# Patient Record
Sex: MALE | Race: WHITE | NOT HISPANIC OR LATINO | Employment: STUDENT | URBAN - NONMETROPOLITAN AREA
[De-identification: names, ages, dates, MRNs, and addresses within clinical notes are randomized per-mention and may not be internally consistent; named-entity substitution may affect disease eponyms.]

---
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
## 2019-07-15 ENCOUNTER — TRANSCRIBE ORDERS (OUTPATIENT)
Dept: LAB | Facility: HOSPITAL | Age: 19
End: 2019-07-15

## 2019-07-15 ENCOUNTER — APPOINTMENT (OUTPATIENT)
Dept: LAB | Facility: HOSPITAL | Age: 19
End: 2019-07-15

## 2019-07-15 DIAGNOSIS — R53.83 MALAISE AND FATIGUE: Primary | ICD-10-CM

## 2019-07-15 DIAGNOSIS — R53.81 MALAISE AND FATIGUE: Primary | ICD-10-CM

## 2019-07-15 LAB
ALBUMIN SERPL-MCNC: 4.5 G/DL (ref 3.5–5)
ALBUMIN/GLOB SERPL: 1.1 G/DL (ref 1.1–2.5)
ALP SERPL-CCNC: 248 U/L (ref 65–260)
ALT SERPL W P-5'-P-CCNC: 566 U/L (ref 0–54)
ANION GAP SERPL CALCULATED.3IONS-SCNC: 9 MMOL/L (ref 4–13)
AST SERPL-CCNC: 270 U/L (ref 7–45)
BILIRUB SERPL-MCNC: 0.4 MG/DL (ref 0.6–1.4)
BUN BLD-MCNC: 17 MG/DL (ref 5–21)
BUN/CREAT SERPL: 17.7 (ref 7–25)
CALCIUM SPEC-SCNC: 10 MG/DL (ref 8.4–10.4)
CHLORIDE SERPL-SCNC: 99 MMOL/L (ref 98–110)
CO2 SERPL-SCNC: 31 MMOL/L (ref 24–31)
CREAT BLD-MCNC: 0.96 MG/DL (ref 0.5–1.4)
DEPRECATED RDW RBC AUTO: 41.5 FL (ref 40–54)
ERYTHROCYTE [DISTWIDTH] IN BLOOD BY AUTOMATED COUNT: 13.6 % (ref 12–15)
GFR SERPL CREATININE-BSD FRML MDRD: 101 ML/MIN/1.73
GIANT PLATELETS: ABNORMAL
GLOBULIN UR ELPH-MCNC: 4.1 GM/DL
GLUCOSE BLD-MCNC: 94 MG/DL (ref 70–100)
HCT VFR BLD AUTO: 48.6 % (ref 40–52)
HGB BLD-MCNC: 15.9 G/DL (ref 14–18)
LYMPHOCYTES # BLD MANUAL: 1.84 10*3/MM3 (ref 0.72–4.86)
LYMPHOCYTES NFR BLD MANUAL: 21.9 % (ref 15–45)
LYMPHOCYTES NFR BLD MANUAL: 9.4 % (ref 4–12)
MCH RBC QN AUTO: 27.3 PG (ref 28–32)
MCHC RBC AUTO-ENTMCNC: 32.7 G/DL (ref 33–36)
MCV RBC AUTO: 83.5 FL (ref 82–95)
MONOCYTES # BLD AUTO: 0.79 10*3/MM3 (ref 0.19–1.3)
NEUTROPHILS # BLD AUTO: 2.54 10*3/MM3 (ref 1.87–8.4)
NEUTROPHILS NFR BLD MANUAL: 28.1 % (ref 39–78)
NEUTS BAND NFR BLD MANUAL: 2.1 % (ref 0–10)
PLATELET # BLD AUTO: 172 10*3/MM3 (ref 130–400)
PMV BLD AUTO: 11.2 FL (ref 6–12)
POTASSIUM BLD-SCNC: 4.7 MMOL/L (ref 3.5–5.3)
PROT SERPL-MCNC: 8.6 G/DL (ref 6.3–8.7)
RBC # BLD AUTO: 5.82 10*6/MM3 (ref 4.8–5.9)
RBC MORPH BLD: NORMAL
SODIUM BLD-SCNC: 139 MMOL/L (ref 135–145)
VARIANT LYMPHS NFR BLD MANUAL: 38.5 % (ref 0–5)
WBC MORPH BLD: NORMAL
WBC NRBC COR # BLD: 8.4 10*3/MM3 (ref 4.8–10.8)

## 2019-07-15 PROCEDURE — 86665 EPSTEIN-BARR CAPSID VCA: CPT | Performed by: PHYSICIAN ASSISTANT

## 2019-07-15 PROCEDURE — 86664 EPSTEIN-BARR NUCLEAR ANTIGEN: CPT | Performed by: PHYSICIAN ASSISTANT

## 2019-07-15 PROCEDURE — 86663 EPSTEIN-BARR ANTIBODY: CPT | Performed by: PHYSICIAN ASSISTANT

## 2019-07-15 PROCEDURE — 85007 BL SMEAR W/DIFF WBC COUNT: CPT | Performed by: PHYSICIAN ASSISTANT

## 2019-07-15 PROCEDURE — 80053 COMPREHEN METABOLIC PANEL: CPT | Performed by: PHYSICIAN ASSISTANT

## 2019-07-15 PROCEDURE — 85025 COMPLETE CBC W/AUTO DIFF WBC: CPT | Performed by: PHYSICIAN ASSISTANT

## 2019-07-15 PROCEDURE — 36415 COLL VENOUS BLD VENIPUNCTURE: CPT | Performed by: PHYSICIAN ASSISTANT

## 2019-07-17 ENCOUNTER — TRANSCRIBE ORDERS (OUTPATIENT)
Dept: ADMINISTRATIVE | Facility: HOSPITAL | Age: 19
End: 2019-07-17

## 2019-07-17 DIAGNOSIS — R53.83 OTHER FATIGUE: Primary | ICD-10-CM

## 2019-07-17 LAB
EBV EA IGG SER-ACNC: <9 U/ML (ref 0–8.9)
EBV NA IGG SER IA-ACNC: <18 U/ML (ref 0–17.9)
EBV VCA IGG SER-ACNC: <18 U/ML (ref 0–17.9)
EBV VCA IGM SER-ACNC: >160 U/ML (ref 0–35.9)
INTERPRETATION: ABNORMAL

## 2019-07-30 ENCOUNTER — LAB (OUTPATIENT)
Dept: LAB | Facility: HOSPITAL | Age: 19
End: 2019-07-30

## 2019-07-30 DIAGNOSIS — R53.83 OTHER FATIGUE: ICD-10-CM

## 2019-07-30 LAB
ALBUMIN SERPL-MCNC: 4.4 G/DL (ref 3.5–5)
ALBUMIN/GLOB SERPL: 1.2 G/DL (ref 1.1–2.5)
ALP SERPL-CCNC: 114 U/L (ref 65–260)
ALT SERPL W P-5'-P-CCNC: 90 U/L (ref 0–54)
ANION GAP SERPL CALCULATED.3IONS-SCNC: 9 MMOL/L (ref 4–13)
AST SERPL-CCNC: 38 U/L (ref 7–45)
BASOPHILS # BLD AUTO: 0.05 10*3/MM3 (ref 0–0.2)
BASOPHILS NFR BLD AUTO: 0.6 % (ref 0–2)
BILIRUB SERPL-MCNC: 0.5 MG/DL (ref 0.6–1.4)
BUN BLD-MCNC: 20 MG/DL (ref 5–21)
BUN/CREAT SERPL: 18.7 (ref 7–25)
CALCIUM SPEC-SCNC: 9.6 MG/DL (ref 8.4–10.4)
CHLORIDE SERPL-SCNC: 99 MMOL/L (ref 98–110)
CO2 SERPL-SCNC: 30 MMOL/L (ref 24–31)
CREAT BLD-MCNC: 1.07 MG/DL (ref 0.5–1.4)
DEPRECATED RDW RBC AUTO: 38.7 FL (ref 40–54)
EOSINOPHIL # BLD AUTO: 0.15 10*3/MM3 (ref 0–0.7)
EOSINOPHIL NFR BLD AUTO: 1.9 % (ref 0–4)
ERYTHROCYTE [DISTWIDTH] IN BLOOD BY AUTOMATED COUNT: 13 % (ref 12–15)
GFR SERPL CREATININE-BSD FRML MDRD: 89 ML/MIN/1.73
GLOBULIN UR ELPH-MCNC: 3.6 GM/DL
GLUCOSE BLD-MCNC: 82 MG/DL (ref 70–100)
HCT VFR BLD AUTO: 42.6 % (ref 40–52)
HGB BLD-MCNC: 14.1 G/DL (ref 14–18)
IMM GRANULOCYTES # BLD AUTO: 0.02 10*3/MM3 (ref 0–0.05)
IMM GRANULOCYTES NFR BLD AUTO: 0.3 % (ref 0–5)
LYMPHOCYTES # BLD AUTO: 2.01 10*3/MM3 (ref 0.72–4.86)
LYMPHOCYTES NFR BLD AUTO: 25.6 % (ref 15–45)
MCH RBC QN AUTO: 27 PG (ref 28–32)
MCHC RBC AUTO-ENTMCNC: 33.1 G/DL (ref 33–36)
MCV RBC AUTO: 81.6 FL (ref 82–95)
MONOCYTES # BLD AUTO: 0.89 10*3/MM3 (ref 0.19–1.3)
MONOCYTES NFR BLD AUTO: 11.3 % (ref 4–12)
NEUTROPHILS # BLD AUTO: 4.74 10*3/MM3 (ref 1.87–8.4)
NEUTROPHILS NFR BLD AUTO: 60.3 % (ref 39–78)
NRBC BLD AUTO-RTO: 0 /100 WBC (ref 0–0.2)
PLATELET # BLD AUTO: 177 10*3/MM3 (ref 130–400)
PMV BLD AUTO: 10.8 FL (ref 6–12)
POTASSIUM BLD-SCNC: 4.2 MMOL/L (ref 3.5–5.3)
PROT SERPL-MCNC: 8 G/DL (ref 6.3–8.7)
RBC # BLD AUTO: 5.22 10*6/MM3 (ref 4.8–5.9)
SODIUM BLD-SCNC: 138 MMOL/L (ref 135–145)
WBC NRBC COR # BLD: 7.86 10*3/MM3 (ref 4.8–10.8)

## 2019-07-30 PROCEDURE — 80053 COMPREHEN METABOLIC PANEL: CPT | Performed by: INTERNAL MEDICINE

## 2019-07-30 PROCEDURE — 85025 COMPLETE CBC W/AUTO DIFF WBC: CPT | Performed by: INTERNAL MEDICINE

## 2019-07-30 PROCEDURE — 36415 COLL VENOUS BLD VENIPUNCTURE: CPT

## 2019-07-31 ENCOUNTER — TRANSCRIBE ORDERS (OUTPATIENT)
Dept: ADMINISTRATIVE | Facility: HOSPITAL | Age: 19
End: 2019-07-31

## 2019-07-31 DIAGNOSIS — R53.83 OTHER FATIGUE: Primary | ICD-10-CM

## 2019-09-25 ENCOUNTER — TRANSCRIBE ORDERS (OUTPATIENT)
Dept: ADMINISTRATIVE | Facility: HOSPITAL | Age: 19
End: 2019-09-25

## 2019-09-25 ENCOUNTER — HOSPITAL ENCOUNTER (OUTPATIENT)
Dept: CT IMAGING | Facility: HOSPITAL | Age: 19
Discharge: HOME | End: 2019-09-25
Admitting: INTERNAL MEDICINE

## 2019-09-25 ENCOUNTER — LAB (OUTPATIENT)
Dept: LAB | Facility: HOSPITAL | Age: 19
End: 2019-09-25

## 2019-09-25 DIAGNOSIS — R10.9 ABDOMINAL PAIN, UNSPECIFIED ABDOMINAL LOCATION: Primary | ICD-10-CM

## 2019-09-25 DIAGNOSIS — R10.31 RIGHT LOWER QUADRANT ABDOMINAL PAIN: Primary | ICD-10-CM

## 2019-09-25 DIAGNOSIS — R10.9 ABDOMINAL PAIN, UNSPECIFIED ABDOMINAL LOCATION: ICD-10-CM

## 2019-09-25 DIAGNOSIS — R10.31 RIGHT LOWER QUADRANT ABDOMINAL PAIN: ICD-10-CM

## 2019-09-25 LAB
ALBUMIN SERPL-MCNC: 4.6 G/DL (ref 3.5–5.2)
ALBUMIN/GLOB SERPL: 1.4 G/DL
ALP SERPL-CCNC: 87 U/L (ref 39–117)
ALT SERPL W P-5'-P-CCNC: 27 U/L (ref 1–41)
ANION GAP SERPL CALCULATED.3IONS-SCNC: 7 MMOL/L (ref 5–15)
AST SERPL-CCNC: 22 U/L (ref 1–40)
BASOPHILS # BLD AUTO: 0.04 10*3/MM3 (ref 0–0.2)
BASOPHILS NFR BLD AUTO: 0.6 % (ref 0–1.5)
BILIRUB SERPL-MCNC: 0.5 MG/DL (ref 0.2–1.2)
BUN BLD-MCNC: 15 MG/DL (ref 6–20)
BUN/CREAT SERPL: 19.2 (ref 7–25)
CALCIUM SPEC-SCNC: 9.3 MG/DL (ref 8.6–10.5)
CHLORIDE SERPL-SCNC: 102 MMOL/L (ref 98–107)
CO2 SERPL-SCNC: 30 MMOL/L (ref 22–29)
CREAT BLD-MCNC: 0.78 MG/DL (ref 0.76–1.27)
CREAT BLDA-MCNC: 0.8 MG/DL (ref 0.6–1.3)
DEPRECATED RDW RBC AUTO: 39.7 FL (ref 37–54)
EOSINOPHIL # BLD AUTO: 0.24 10*3/MM3 (ref 0–0.4)
EOSINOPHIL NFR BLD AUTO: 3.6 % (ref 0.3–6.2)
ERYTHROCYTE [DISTWIDTH] IN BLOOD BY AUTOMATED COUNT: 13.2 % (ref 12.3–15.4)
GFR SERPL CREATININE-BSD FRML MDRD: 128 ML/MIN/1.73
GLOBULIN UR ELPH-MCNC: 3.2 GM/DL
GLUCOSE BLD-MCNC: 99 MG/DL (ref 65–99)
HCT VFR BLD AUTO: 44.7 % (ref 37.5–51)
HGB BLD-MCNC: 15.1 G/DL (ref 13–17.7)
IMM GRANULOCYTES # BLD AUTO: 0.01 10*3/MM3 (ref 0–0.05)
IMM GRANULOCYTES NFR BLD AUTO: 0.1 % (ref 0–0.5)
LIPASE SERPL-CCNC: 21 U/L (ref 13–60)
LYMPHOCYTES # BLD AUTO: 1.6 10*3/MM3 (ref 0.7–3.1)
LYMPHOCYTES NFR BLD AUTO: 23.8 % (ref 19.6–45.3)
MCH RBC QN AUTO: 27.8 PG (ref 26.6–33)
MCHC RBC AUTO-ENTMCNC: 33.8 G/DL (ref 31.5–35.7)
MCV RBC AUTO: 82.3 FL (ref 79–97)
MONOCYTES # BLD AUTO: 0.74 10*3/MM3 (ref 0.1–0.9)
MONOCYTES NFR BLD AUTO: 11 % (ref 5–12)
NEUTROPHILS # BLD AUTO: 4.08 10*3/MM3 (ref 1.7–7)
NEUTROPHILS NFR BLD AUTO: 60.9 % (ref 42.7–76)
NRBC BLD AUTO-RTO: 0 /100 WBC (ref 0–0.2)
PLATELET # BLD AUTO: 167 10*3/MM3 (ref 140–450)
PMV BLD AUTO: 10.6 FL (ref 6–12)
POTASSIUM BLD-SCNC: 4.5 MMOL/L (ref 3.5–5.2)
PROT SERPL-MCNC: 7.8 G/DL (ref 6–8.5)
RBC # BLD AUTO: 5.43 10*6/MM3 (ref 4.14–5.8)
SODIUM BLD-SCNC: 139 MMOL/L (ref 136–145)
WBC NRBC COR # BLD: 6.71 10*3/MM3 (ref 3.4–10.8)

## 2019-09-25 PROCEDURE — 83690 ASSAY OF LIPASE: CPT | Performed by: INTERNAL MEDICINE

## 2019-09-25 PROCEDURE — 74177 CT ABD & PELVIS W/CONTRAST: CPT

## 2019-09-25 PROCEDURE — 25010000002 IOPAMIDOL 61 % SOLUTION: Performed by: INTERNAL MEDICINE

## 2019-09-25 PROCEDURE — 85025 COMPLETE CBC W/AUTO DIFF WBC: CPT | Performed by: INTERNAL MEDICINE

## 2019-09-25 PROCEDURE — 80053 COMPREHEN METABOLIC PANEL: CPT | Performed by: INTERNAL MEDICINE

## 2019-09-25 PROCEDURE — 36415 COLL VENOUS BLD VENIPUNCTURE: CPT | Performed by: INTERNAL MEDICINE

## 2019-09-25 PROCEDURE — 82565 ASSAY OF CREATININE: CPT

## 2019-09-25 RX ADMIN — IOPAMIDOL 100 ML: 612 INJECTION, SOLUTION INTRAVENOUS at 11:37

## 2020-07-16 ENCOUNTER — APPOINTMENT (OUTPATIENT)
Dept: GENERAL RADIOLOGY | Facility: HOSPITAL | Age: 20
End: 2020-07-16

## 2020-07-16 ENCOUNTER — HOSPITAL ENCOUNTER (EMERGENCY)
Facility: HOSPITAL | Age: 20
Discharge: HOME | End: 2020-07-16
Admitting: FAMILY MEDICINE

## 2020-07-16 VITALS
WEIGHT: 190 LBS | SYSTOLIC BLOOD PRESSURE: 154 MMHG | BODY MASS INDEX: 23.14 KG/M2 | HEART RATE: 97 BPM | RESPIRATION RATE: 18 BRPM | TEMPERATURE: 98.5 F | OXYGEN SATURATION: 98 % | DIASTOLIC BLOOD PRESSURE: 83 MMHG | HEIGHT: 76 IN

## 2020-07-16 DIAGNOSIS — B34.8 RHINOVIRUS INFECTION: Primary | ICD-10-CM

## 2020-07-16 LAB
ALBUMIN SERPL-MCNC: 5.5 G/DL (ref 3.5–5.2)
ALBUMIN/GLOB SERPL: 1.8 G/DL
ALP SERPL-CCNC: 92 U/L (ref 39–117)
ALT SERPL W P-5'-P-CCNC: 15 U/L (ref 1–41)
ANION GAP SERPL CALCULATED.3IONS-SCNC: 17 MMOL/L (ref 5–15)
AST SERPL-CCNC: 18 U/L (ref 1–40)
B PARAPERT DNA SPEC QL NAA+PROBE: NOT DETECTED
B PERT DNA SPEC QL NAA+PROBE: NOT DETECTED
BASOPHILS # BLD AUTO: 0.04 10*3/MM3 (ref 0–0.2)
BASOPHILS NFR BLD AUTO: 0.3 % (ref 0–1.5)
BILIRUB SERPL-MCNC: 0.4 MG/DL (ref 0–1.2)
BUN SERPL-MCNC: 20 MG/DL (ref 6–20)
BUN/CREAT SERPL: 20.2 (ref 7–25)
C PNEUM DNA NPH QL NAA+NON-PROBE: NOT DETECTED
CALCIUM SPEC-SCNC: 10.3 MG/DL (ref 8.6–10.5)
CHLORIDE SERPL-SCNC: 99 MMOL/L (ref 98–107)
CO2 SERPL-SCNC: 24 MMOL/L (ref 22–29)
CREAT SERPL-MCNC: 0.99 MG/DL (ref 0.76–1.27)
CRP SERPL-MCNC: 0.12 MG/DL (ref 0–0.5)
D DIMER PPP FEU-MCNC: <0.22 MG/L (FEU) (ref 0–0.5)
D-LACTATE SERPL-SCNC: 3.2 MMOL/L (ref 0.5–2)
DEPRECATED RDW RBC AUTO: 35.8 FL (ref 37–54)
EOSINOPHIL # BLD AUTO: 0.03 10*3/MM3 (ref 0–0.4)
EOSINOPHIL NFR BLD AUTO: 0.2 % (ref 0.3–6.2)
ERYTHROCYTE [DISTWIDTH] IN BLOOD BY AUTOMATED COUNT: 11.9 % (ref 12.3–15.4)
FLUAV H1 2009 PAND RNA NPH QL NAA+PROBE: NOT DETECTED
FLUAV H1 HA GENE NPH QL NAA+PROBE: NOT DETECTED
FLUAV H3 RNA NPH QL NAA+PROBE: NOT DETECTED
FLUAV SUBTYP SPEC NAA+PROBE: NOT DETECTED
FLUBV RNA ISLT QL NAA+PROBE: NOT DETECTED
GFR SERPL CREATININE-BSD FRML MDRD: 96 ML/MIN/1.73
GLOBULIN UR ELPH-MCNC: 3.1 GM/DL
GLUCOSE SERPL-MCNC: 97 MG/DL (ref 65–99)
HADV DNA SPEC NAA+PROBE: NOT DETECTED
HCOV 229E RNA SPEC QL NAA+PROBE: NOT DETECTED
HCOV HKU1 RNA SPEC QL NAA+PROBE: NOT DETECTED
HCOV NL63 RNA SPEC QL NAA+PROBE: NOT DETECTED
HCOV OC43 RNA SPEC QL NAA+PROBE: NOT DETECTED
HCT VFR BLD AUTO: 46.5 % (ref 37.5–51)
HGB BLD-MCNC: 15.9 G/DL (ref 13–17.7)
HMPV RNA NPH QL NAA+NON-PROBE: NOT DETECTED
HPIV1 RNA SPEC QL NAA+PROBE: NOT DETECTED
HPIV2 RNA SPEC QL NAA+PROBE: NOT DETECTED
HPIV3 RNA NPH QL NAA+PROBE: NOT DETECTED
HPIV4 P GENE NPH QL NAA+PROBE: NOT DETECTED
IMM GRANULOCYTES # BLD AUTO: 0.04 10*3/MM3 (ref 0–0.05)
IMM GRANULOCYTES NFR BLD AUTO: 0.3 % (ref 0–0.5)
LACTATE HOLD SPECIMEN: NORMAL
LDH SERPL-CCNC: 184 U/L (ref 135–225)
LYMPHOCYTES # BLD AUTO: 1.49 10*3/MM3 (ref 0.7–3.1)
LYMPHOCYTES NFR BLD AUTO: 10.9 % (ref 19.6–45.3)
M PNEUMO IGG SER IA-ACNC: NOT DETECTED
MCH RBC QN AUTO: 28.1 PG (ref 26.6–33)
MCHC RBC AUTO-ENTMCNC: 34.2 G/DL (ref 31.5–35.7)
MCV RBC AUTO: 82.2 FL (ref 79–97)
MONOCYTES # BLD AUTO: 1.07 10*3/MM3 (ref 0.1–0.9)
MONOCYTES NFR BLD AUTO: 7.8 % (ref 5–12)
NEUTROPHILS NFR BLD AUTO: 11.01 10*3/MM3 (ref 1.7–7)
NEUTROPHILS NFR BLD AUTO: 80.5 % (ref 42.7–76)
NRBC BLD AUTO-RTO: 0 /100 WBC (ref 0–0.2)
PLATELET # BLD AUTO: 182 10*3/MM3 (ref 140–450)
PMV BLD AUTO: 11.4 FL (ref 6–12)
POTASSIUM SERPL-SCNC: 4.3 MMOL/L (ref 3.5–5.2)
PROCALCITONIN SERPL-MCNC: 0.04 NG/ML (ref 0–0.25)
PROT SERPL-MCNC: 8.6 G/DL (ref 6–8.5)
RBC # BLD AUTO: 5.66 10*6/MM3 (ref 4.14–5.8)
RHINOVIRUS RNA SPEC NAA+PROBE: DETECTED
RSV RNA NPH QL NAA+NON-PROBE: NOT DETECTED
SARS-COV-2 RNA PNL SPEC NAA+PROBE: NOT DETECTED
SODIUM SERPL-SCNC: 140 MMOL/L (ref 136–145)
WBC # BLD AUTO: 13.68 10*3/MM3 (ref 3.4–10.8)

## 2020-07-16 PROCEDURE — 85379 FIBRIN DEGRADATION QUANT: CPT | Performed by: PHYSICIAN ASSISTANT

## 2020-07-16 PROCEDURE — 83605 ASSAY OF LACTIC ACID: CPT | Performed by: PHYSICIAN ASSISTANT

## 2020-07-16 PROCEDURE — 86140 C-REACTIVE PROTEIN: CPT | Performed by: PHYSICIAN ASSISTANT

## 2020-07-16 PROCEDURE — 83615 LACTATE (LD) (LDH) ENZYME: CPT | Performed by: PHYSICIAN ASSISTANT

## 2020-07-16 PROCEDURE — 87635 SARS-COV-2 COVID-19 AMP PRB: CPT | Performed by: PHYSICIAN ASSISTANT

## 2020-07-16 PROCEDURE — 93010 ELECTROCARDIOGRAM REPORT: CPT | Performed by: INTERNAL MEDICINE

## 2020-07-16 PROCEDURE — 0100U HC BIOFIRE FILMARRAY RESP PANEL 2: CPT | Performed by: PHYSICIAN ASSISTANT

## 2020-07-16 PROCEDURE — 85025 COMPLETE CBC W/AUTO DIFF WBC: CPT | Performed by: PHYSICIAN ASSISTANT

## 2020-07-16 PROCEDURE — 99283 EMERGENCY DEPT VISIT LOW MDM: CPT

## 2020-07-16 PROCEDURE — 80053 COMPREHEN METABOLIC PANEL: CPT | Performed by: PHYSICIAN ASSISTANT

## 2020-07-16 PROCEDURE — 84145 PROCALCITONIN (PCT): CPT | Performed by: PHYSICIAN ASSISTANT

## 2020-07-16 PROCEDURE — 93005 ELECTROCARDIOGRAM TRACING: CPT | Performed by: PHYSICIAN ASSISTANT

## 2020-07-16 PROCEDURE — C9803 HOPD COVID-19 SPEC COLLECT: HCPCS | Performed by: NURSE PRACTITIONER

## 2020-07-16 PROCEDURE — 71045 X-RAY EXAM CHEST 1 VIEW: CPT

## 2020-07-16 PROCEDURE — U0003 INFECTIOUS AGENT DETECTION BY NUCLEIC ACID (DNA OR RNA); SEVERE ACUTE RESPIRATORY SYNDROME CORONAVIRUS 2 (SARS-COV-2) (CORONAVIRUS DISEASE [COVID-19]), AMPLIFIED PROBE TECHNIQUE, MAKING USE OF HIGH THROUGHPUT TECHNOLOGIES AS DESCRIBED BY CMS-2020-01-R: HCPCS | Performed by: NURSE PRACTITIONER

## 2020-07-16 NOTE — ED PROVIDER NOTES
Subjective   History of Present Illness    Patient is a 19-year-old male presenting to ED with shortness of breath.  Patient reported the past few days he has had increased shortness of breath which became worse today when he was trying to play golf with his friends.  Patient reported he became to were dyspneic and had to stop playing.  Patient noted that as a child he had a history of asthma with his last asthma attack and inhaler use at age 11.  Patient reported he was having increased chest tightness and shortness of breath so he tried using his inhaler.  Patient reported that that did open up and alleviate some of the shortness of breath and chest tightness however he still feels like he cannot get a full deep breath.  Patient became concerned because he is also had a chest pressure and pain in the lower aspect of his sternum.  Patient reported he has been congested with a sore throat for 2 days as well which did not improve after use of Sudafed.  Patient reported he often times has allergies in the fall and winter but never before in the summer.  Patient reported that his fevers have gone as high as 99.  Patient reported some nausea however he denies any emesis, diarrhea, abdominal pain, or headaches.  Patient denies any known recent sick contact but reported both of his parents work in a hospital and his sister works as a  provider for hospital workers children.  Patient denies any medication use outside of the inhaler today.  Patient denies any palpitations.    Patient denies any peripheral edema.  Patient denies use of any tobacco products including cigarettes.  Patient denies use of any hormone replacement.  Patient denies any recent increase in immobilization, oncological treatment, or recent travel.    Patient denies any known medication allergies.  Patient reports a medical history positive for ADD, asthma as a child, and IBS.  Patient has a surgical history positive for cholecystectomy.  Patient  "denies use of cigarettes, tobacco products, alcohol, marijuana, or any other IV/recreational/illicit drugs.    Records reviewed show patient has no previous ED visits.   Patent was last seen in the outpatient setting on 6/1/20 at PCP for mild intermittent asthma and ADD.  Patient has no previous chest XR or imaging.     Review of Systems   Constitutional: Positive for appetite change (\"I've lost my taste\") and fever (99 at highest). Negative for chills and diaphoresis.   HENT: Positive for congestion and sore throat. Negative for sinus pressure and trouble swallowing.    Eyes: Negative.    Respiratory: Positive for chest tightness and shortness of breath. Negative for choking and wheezing.    Cardiovascular: Positive for chest pain (lower restrosternal).   Gastrointestinal: Positive for nausea. Negative for abdominal pain, diarrhea and vomiting.   Genitourinary: Negative.    Musculoskeletal: Negative.  Negative for myalgias (Denies body aches).   Skin: Negative.    Neurological: Negative.  Negative for headaches.   Psychiatric/Behavioral: Negative.    All other systems reviewed and are negative.      Past Medical History:   Diagnosis Date   • ADD (attention deficit disorder)    • Asthma    • Hematochezia    • IBS (irritable bowel syndrome)        No Known Allergies    Past Surgical History:   Procedure Laterality Date   • CHOLECYSTECTOMY  12/21/2016       Family History   Problem Relation Age of Onset   • Colon cancer Neg Hx    • Colon polyps Neg Hx        Social History     Socioeconomic History   • Marital status: Single     Spouse name: Not on file   • Number of children: Not on file   • Years of education: Not on file   • Highest education level: Not on file   Tobacco Use   • Smoking status: Never Smoker   • Smokeless tobacco: Never Used   Substance and Sexual Activity   • Alcohol use: No   • Sexual activity: Defer           Objective   Physical Exam   Constitutional: He is oriented to person, place, and time. " "He appears well-developed and well-nourished.  Non-toxic appearance. He appears distressed (due to anxiety).   HENT:   Head: Normocephalic and atraumatic.   Mouth/Throat: Oropharynx is clear and moist. No oropharyngeal exudate or posterior oropharyngeal edema.   Eyes: Pupils are equal, round, and reactive to light. EOM are normal.   Neck: Normal range of motion. Neck supple.   Cardiovascular: Regular rhythm, normal heart sounds and intact distal pulses. Tachycardia present.   No calf tenderness bilaterally, no peripheral edema   Pulmonary/Chest: Effort normal. Tachypnea noted. No respiratory distress. He has decreased breath sounds in the right lower field and the left lower field. He has no wheezes. He has no rhonchi. He has no rales. He exhibits no tenderness (No reproducible tenderness to palpitation of chest wal).   Musculoskeletal: Normal range of motion. He exhibits no edema.   Neurological: He is alert and oriented to person, place, and time.   Skin: Skin is warm and dry. Capillary refill takes less than 2 seconds. No rash noted. No pallor.   Psychiatric: His behavior is normal. Judgment normal. His mood appears anxious (mild).   Nursing note and vitals reviewed.      Procedures         No current facility-administered medications for this encounter.     Current Outpatient Medications:   •  amphetamine-dextroamphetamine (ADDERALL) 10 MG tablet, dextroamphetamine-amphetamine 10 mg tablet, Disp: , Rfl:     Vital signs:  /83   Pulse 97   Temp 98.5 °F (36.9 °C) (Oral)   Resp 18   Ht 193 cm (76\")   Wt 86.2 kg (190 lb)   SpO2 98%   BMI 23.13 kg/m²        ED LAB RESULTS:   Lab Results (last 24 hours)     Procedure Component Value Units Date/Time    Respiratory Panel, PCR - Swab, Nasopharynx [[id number]]  (Abnormal) Collected:  07/16/20 1849    Specimen:  Swab from Nasopharynx Updated:  07/16/20 2016     ADENOVIRUS, PCR Not Detected     Coronavirus 229E Not Detected     Coronavirus HKU1 Not Detected     " Coronavirus NL63 Not Detected     Coronavirus OC43 Not Detected     Human Metapneumovirus Not Detected     Human Rhinovirus/Enterovirus Detected     Influenza B PCR Not Detected     Parainfluenza Virus 1 Not Detected     Parainfluenza Virus 2 Not Detected     Parainfluenza Virus 3 Not Detected     Parainfluenza Virus 4 Not Detected     Bordetella pertussis pcr Not Detected     Influenza A H1 2009 PCR Not Detected     Chlamydophila pneumoniae PCR Not Detected     Mycoplasma pneumo by PCR Not Detected     Influenza A PCR Not Detected     Influenza A H3 Not Detected     Influenza A H1 Not Detected     RSV, PCR Not Detected     Bordetella parapertussis PCR Not Detected    Narrative:       The coronavirus on the RVP is NOT COVID-19 and is NOT indicative of infection with COVID-19.     COVID-19 [facility] IN-HOUSE, Nasal Swab No Transport Media - Swab, Nasal Cavity [[id number]]  (Normal) Collected:  07/16/20 1849    Specimen:  Swab from Nasal Cavity Updated:  07/16/20 1959     COVID19 Not Detected    Narrative:       Fact sheet for providers: https://www.fda.gov/media/570763/download     Fact sheet for patients: https://www.fda.gov/media/269463/download    CBC & Differential [[id number]] Collected:  07/16/20 1859    Specimen:  Blood Updated:  07/16/20 1914    Narrative:       The following orders were created for panel order CBC & Differential.  Procedure                               Abnormality         Status                     ---------                               -----------         ------                     CBC Auto Differential[[id number]]        Abnormal            Final result                 Please view results for these tests on the individual orders.    Comprehensive Metabolic Panel [[id number]]  (Abnormal) Collected:  07/16/20 1859    Specimen:  Blood Updated:  07/16/20 1935     Glucose 97 mg/dL      BUN 20 mg/dL      Creatinine 0.99 mg/dL      Sodium 140 mmol/L      Potassium 4.3 mmol/L      Chloride 99 mmol/L   "    CO2 24.0 mmol/L      Calcium 10.3 mg/dL      Total Protein 8.6 g/dL      Albumin 5.50 g/dL      ALT (SGPT) 15 U/L      AST (SGOT) 18 U/L      Alkaline Phosphatase 92 U/L      Total Bilirubin 0.4 mg/dL      eGFR Non African Amer 96 mL/min/1.73      Globulin 3.1 gm/dL      A/G Ratio 1.8 g/dL      BUN/Creatinine Ratio 20.2     Anion Gap 17.0 mmol/L     Narrative:       GFR Normal >60  Chronic Kidney Disease <60  Kidney Failure <15      Lactate Dehydrogenase [[id number]]  (Normal) Collected:  07/16/20 1859    Specimen:  Blood Updated:  07/16/20 1935      U/L     Procalcitonin [[id number]]  (Normal) Collected:  07/16/20 1859    Specimen:  Blood Updated:  07/16/20 1939     Procalcitonin 0.04 ng/mL     Narrative:       As a Marker for Sepsis (Non-Neonates):   1. <0.5 ng/mL represents a low risk of severe sepsis and/or septic shock.  1. >2 ng/mL represents a high risk of severe sepsis and/or septic shock.    As a Marker for Lower Respiratory Tract Infections that require antibiotic therapy:  PCT on Admission     Antibiotic Therapy             6-12 Hrs later  > 0.5                Strongly Recommended            >0.25 - <0.5         Recommended  0.1 - 0.25           Discouraged                   Remeasure/reassess PCT  <0.1                 Strongly Discouraged          Remeasure/reassess PCT      As 28 day mortality risk marker: \"Change in Procalcitonin Result\" (> 80 % or <=80 %) if Day 0 (or Day 1) and Day 4 values are available. Refer to http://www.[employer]-pct-calculator.com/   Change in PCT <=80 %   A decrease of PCT levels below or equal to 80 % defines a positive change in PCT test result representing a higher risk for 28-day all-cause mortality of patients diagnosed with severe sepsis or septic shock.  Change in PCT > 80 %   A decrease of PCT levels of more than 80 % defines a negative change in PCT result representing a lower risk for 28-day all-cause mortality of patients diagnosed with severe sepsis or septic " shock.                Results may be falsely decreased if patient taking Biotin.     C-reactive Protein [[id number]]  (Normal) Collected:  07/16/20 1859    Specimen:  Blood Updated:  07/16/20 1939     C-Reactive Protein 0.12 mg/dL     D-dimer, Quantitative [[id number]]  (Normal) Collected:  07/16/20 1859    Specimen:  Blood Updated:  07/16/20 1928     D-Dimer, Quantitative <0.22 mg/L (FEU)     Narrative:       Reference Range is 0-0.50 mg/L FEU. However, results <0.50 mg/L FEU tends to rule out DVT or PE. Results >0.50 mg/L FEU are not useful in predicting absence or presence of DVT or PE.      Lactic Acid, Plasma [[id number]]  (Abnormal) Collected:  07/16/20 1859    Specimen:  Blood Updated:  07/16/20 1935     Lactate 3.2 mmol/L     CBC Auto Differential [[id number]]  (Abnormal) Collected:  07/16/20 1859    Specimen:  Blood Updated:  07/16/20 1914     WBC 13.68 10*3/mm3      RBC 5.66 10*6/mm3      Hemoglobin 15.9 g/dL      Hematocrit 46.5 %      MCV 82.2 fL      MCH 28.1 pg      MCHC 34.2 g/dL      RDW 11.9 %      RDW-SD 35.8 fl      MPV 11.4 fL      Platelets 182 10*3/mm3      Neutrophil % 80.5 %      Lymphocyte % 10.9 %      Monocyte % 7.8 %      Eosinophil % 0.2 %      Basophil % 0.3 %      Immature Grans % 0.3 %      Neutrophils, Absolute 11.01 10*3/mm3      Lymphocytes, Absolute 1.49 10*3/mm3      Monocytes, Absolute 1.07 10*3/mm3      Eosinophils, Absolute 0.03 10*3/mm3      Basophils, Absolute 0.04 10*3/mm3      Immature Grans, Absolute 0.04 10*3/mm3      nRBC 0.0 /100 WBC     Lactic Acid, Reflex Timer (This will reflex a repeat order 3-3:15 hours after ordered.) [[id number]] Collected:  07/16/20 1859    Specimen:  Blood Updated:  07/16/20 1935             IMAGING RESULTS  XR Chest AP   ED Interpretation   See results below      Final Result   No active disease is seen.           This report was finalized on 07/16/2020 18:55 by Dr. Bennett Amador MD.                     ED Course  ED Course as of Jul 16 0357    Thu Jul 16, 2020 1855 CXR shows: No active disease is seen.    [JS]   1857 Discussed case with Ms. Damion Alvarezs NP.  Pending results of labs as well as imaging Ms. Araya will reevaluate and disposition accordingly.  Please see my shoulders note below for further ED course as well as final diagnosis.Working diagnosis is: Shortness of breath.    [JS]   2208 I discussed the patient's testing results with him.  He voiced understanding of results as well as d'c instructions. I discussed outpatient covid testing with him.  He would like it completed.  He voiced understanding of self isolation until results received.    [KS]      ED Course User Index  [JS] Nicolas Thorne PA-C  [KS] Damion Araya, GERALDINE                                           MDM  Number of Diagnoses or Management Options  Rhinovirus infection: minor     Amount and/or Complexity of Data Reviewed  Clinical lab tests: ordered and reviewed  Tests in the radiology section of CPT®: ordered and reviewed  Independent visualization of images, tracings, or specimens: yes    Risk of Complications, Morbidity, and/or Mortality  Presenting problems: minimal  Diagnostic procedures: minimal  Management options: minimal    Patient Progress  Patient progress: stable      Final diagnoses:   Rhinovirus infection            Damion Araya, GERALDINE  07/16/20 9860

## 2020-07-17 ENCOUNTER — EPISODE CHANGES (OUTPATIENT)
Dept: CASE MANAGEMENT | Facility: OTHER | Age: 20
End: 2020-07-17

## 2020-07-17 NOTE — DISCHARGE INSTRUCTIONS
Increase fluids. Tylenol  as needed for pain/fever.  You  must self isolate until covid testing results available. Follow up with Pcp tomorrow - call for appointment. Return to ED if condition does not improve or worsens

## 2020-07-17 NOTE — ED NOTES
Patient is a 19 year old male that present to ER with complaints of dry cough, runny nose, fever and SOA for the past three days. Patient reports that he had first felt that these were due to seasonal allergies. Patient states that he was playing golf today and started feeling generalized weakness with increased SOA. Patient states that he used his rescuer inhaler for the first time since he was 11 years old. Patient reports that the inhaler made his SOA worse and he had to pull over while driving his car because he thought that he was going to pass out.      Lily Mcbride, RN  07/16/20 1918

## 2020-07-18 ENCOUNTER — TELEPHONE (OUTPATIENT)
Dept: EMERGENCY DEPT | Facility: HOSPITAL | Age: 20
End: 2020-07-18

## 2020-07-18 LAB
COVID LABCORP PRIORITY: NORMAL
SARS-COV-2 RNA RESP QL NAA+PROBE: NOT DETECTED

## 2020-07-21 ENCOUNTER — OFFICE VISIT (OUTPATIENT)
Age: 20
End: 2020-07-21

## 2020-07-21 VITALS — HEART RATE: 80 BPM | TEMPERATURE: 97.5 F | OXYGEN SATURATION: 98 %

## 2020-07-23 LAB
REPORT: NORMAL
SARS-COV-2: NOT DETECTED
THIS TEST SENT TO: NORMAL

## 2020-07-25 ENCOUNTER — TELEPHONE (OUTPATIENT)
Age: 20
End: 2020-07-25

## 2020-07-25 NOTE — TELEPHONE ENCOUNTER
This [facility] called and gave the patients their lab results. Patient stated understanding.

## 2020-07-28 ENCOUNTER — TRANSCRIBE ORDERS (OUTPATIENT)
Dept: ADMINISTRATIVE | Facility: HOSPITAL | Age: 20
End: 2020-07-28

## 2020-07-28 DIAGNOSIS — R00.2 PALPITATIONS: Primary | ICD-10-CM

## 2020-07-29 ENCOUNTER — HOSPITAL ENCOUNTER (OUTPATIENT)
Dept: CARDIOLOGY | Facility: HOSPITAL | Age: 20
Discharge: HOME | End: 2020-07-29
Admitting: INTERNAL MEDICINE

## 2020-07-29 DIAGNOSIS — R00.2 PALPITATIONS: ICD-10-CM

## 2020-07-29 PROCEDURE — 93225 XTRNL ECG REC<48 HRS REC: CPT

## 2020-07-29 PROCEDURE — 93226 XTRNL ECG REC<48 HR SCAN A/R: CPT

## 2020-08-02 PROCEDURE — 93227 XTRNL ECG REC<48 HR R&I: CPT | Performed by: INTERNAL MEDICINE

## 2021-01-05 PROCEDURE — 87635 SARS-COV-2 COVID-19 AMP PRB: CPT | Performed by: NURSE PRACTITIONER

## 2021-01-07 ENCOUNTER — LAB (OUTPATIENT)
Dept: LAB | Facility: HOSPITAL | Age: 21
End: 2021-01-07

## 2021-01-07 ENCOUNTER — TRANSCRIBE ORDERS (OUTPATIENT)
Dept: LAB | Facility: HOSPITAL | Age: 21
End: 2021-01-07

## 2021-01-07 DIAGNOSIS — R09.81 HEAD CONGESTION: ICD-10-CM

## 2021-01-07 DIAGNOSIS — R09.81 HEAD CONGESTION: Primary | ICD-10-CM

## 2021-01-07 LAB — SARS-COV-2 RNA PNL SPEC NAA+PROBE: NOT DETECTED

## 2021-01-07 PROCEDURE — C9803 HOPD COVID-19 SPEC COLLECT: HCPCS

## 2021-01-07 PROCEDURE — 87635 SARS-COV-2 COVID-19 AMP PRB: CPT

## 2021-02-05 ENCOUNTER — TELEPHONE (OUTPATIENT)
Dept: NEUROLOGY | Facility: CLINIC | Age: 21
End: 2021-02-05

## 2021-02-05 NOTE — TELEPHONE ENCOUNTER
Provider: ALEXEI OLMEDO PA-C  Caller: RHEA MAYEN  Relationship to Patient: SELF    Reason for Call: PT IS GOING TO BE A NEW PT, HAS APPT ON 4-16-21 FOR ALTERED MENTAL STATUS AND HE IS WANTING TO BE SEEN SOONER. HE IS HAVING EPISODES  OF A SENSATION OF SQUEEZING IN FOREHEAD THEN HE CANT CONCENTRATE AND NOT PROCESSING TO GET HIS WORDS OUT THIS IS ALMOST LIKE EVERY DAY.  HE SAYS THAT HE GOES BLANK AND HAS NO MEMORY OF DOING IT ONLY WHAT PEOPLE HAS TOLD HIM. PT STATES THE PCP THINKS IT MAYBE SEIZURE ACTIVITY/ HE REFERRED THE PT.    PT IS WANTING TO SEE IF THERE IS SOMEONE ELSE HE CAN SEE, IF HE CAN BE WORKED IN OR WHAT DOES HE NEED TO DO?       PLEASE CALL HIM BACK [phone] AND LET HIM KNOW.

## 2021-02-08 ENCOUNTER — OFFICE VISIT (OUTPATIENT)
Dept: NEUROLOGY | Facility: CLINIC | Age: 21
End: 2021-02-08

## 2021-02-08 VITALS
SYSTOLIC BLOOD PRESSURE: 138 MMHG | DIASTOLIC BLOOD PRESSURE: 86 MMHG | TEMPERATURE: 97.1 F | HEIGHT: 76 IN | WEIGHT: 200 LBS | BODY MASS INDEX: 24.36 KG/M2

## 2021-02-08 DIAGNOSIS — R68.89 EPISODES OF DECREASED ATTENTIVENESS: Primary | ICD-10-CM

## 2021-02-08 PROCEDURE — 99204 OFFICE O/P NEW MOD 45 MIN: CPT | Performed by: PHYSICIAN ASSISTANT

## 2021-02-08 RX ORDER — DEXTROAMPHETAMINE SACCHARATE, AMPHETAMINE ASPARTATE MONOHYDRATE, DEXTROAMPHETAMINE SULFATE AND AMPHETAMINE SULFATE 5; 5; 5; 5 MG/1; MG/1; MG/1; MG/1
CAPSULE, EXTENDED RELEASE ORAL
COMMUNITY
End: 2021-08-21 | Stop reason: SDUPTHER

## 2021-02-08 NOTE — PROGRESS NOTES
"Subjective     Chief Complaint: episodes of decreased concentration     History of Present Illness   Robert Molina is a 20 y.o. male who comes to clinic today for evaluation of episodes of decreased concentration. He initially noted symptoms in late 2020. This has remained relatively static over time, with patient noting at least 4 separate episodes. During these spells, he states that he stares off for several minutes and becomes less responsive. He may swallow several times, and states that his hands twitch. There is an associated metallic taste as well as fatigue following the spells. He denies any clear associated loss of consciousness, though describes a sense of general heaviness during the episodes. There are no associated convulsions or urinary incontinence. He also denies any clear modifying factors.    He denies any past history or family history of seizures. He was born full term without any complications. He denies any history of meningitis or encephalitis as well as any illicit drug use.    I have reviewed and confirmed the past family, social and medical history as accurate on 2/8/2021.     Review of Systems   Constitutional: Negative.    HENT: Negative.    Eyes: Negative.    Respiratory: Negative.    Cardiovascular: Negative.    Gastrointestinal: Negative.    Endocrine: Negative.    Genitourinary: Negative.    Musculoskeletal: Negative.    Skin: Negative.    Allergic/Immunologic: Negative.    Hematological: Negative.    Psychiatric/Behavioral: Negative.        Objective     /86   Temp 97.1 °F (36.2 °C)   Ht 193 cm (76\")   Wt 90.7 kg (200 lb)   BMI 24.34 kg/m²     General appearance today is normal.   Peripheral pulses were present and symmetric.  The ophthalmoscopic exam today is unremarkable. The discs and posterior elements are unremarkable.      Physical Exam  Neurological:      Coordination: Finger-Nose-Finger Test normal.      Gait: Gait is intact.      Deep Tendon Reflexes: " Strength normal.      Reflex Scores:       Bicep reflexes are 2+ on the right side and 2+ on the left side.       Brachioradialis reflexes are 2+ on the right side and 2+ on the left side.       Patellar reflexes are 2+ on the right side.  Psychiatric:         Speech: Speech normal.          Neurologic Exam     Mental Status   Speech: speech is normal   Level of consciousness: alert  Normal comprehension.     Cranial Nerves   Cranial nerves II through XII intact.     Motor Exam   Muscle bulk: normal  Overall muscle tone: normal    Strength   Strength 5/5 throughout.     Sensory Exam   Light touch normal.     Gait, Coordination, and Reflexes     Gait  Gait: normal    Coordination   Finger to nose coordination: normal    Tremor   Resting tremor: absent    Reflexes   Right brachioradialis: 2+  Left brachioradialis: 2+  Right biceps: 2+  Left biceps: 2+  Right patellar: 2+        Assessment/Plan   Diagnoses and all orders for this visit:    1. Episodes of decreased attentiveness (Primary)  -     EEG Awake or Drowsy Routine  -     MRI Brain Without Contrast          Discussion/Summary   Robert Molina comes to clinic today for evaluation of episodes of decreased concentration. The etiology of his symptoms is unclear, though his history is somewhat concerning for partial seizures. This was discussed. It was elected to obtain an MRI of the brain and EEG. He will then follow up in 4 months , or sooner if needed.   I spent 45 minutes face to face with the patient with 30 minutes spent on discussing diagnosis, prognosis, diagnostic testing, evaluation, current status, treatment options and management as discussed above.       As part of this visit I discussed the history with the patient .      Sabrina Salazar PA-C

## 2021-02-11 ENCOUNTER — TELEPHONE (OUTPATIENT)
Dept: NEUROLOGY | Facility: CLINIC | Age: 21
End: 2021-02-11

## 2021-02-11 DIAGNOSIS — R68.89 EPISODES OF DECREASED ATTENTIVENESS: Primary | ICD-10-CM

## 2021-02-11 NOTE — TELEPHONE ENCOUNTER
Caller: PT    Relationship: SELF    Best call back number: [phone]    What orders are you requesting (i.e. lab or imaging): NEW EEG ORDER    In what timeframe would the patient need to come in: NA    Where will you receive your lab/imaging services:     Additional notes: PATIENT CALLED AND STATED THE REFERRAL WAS INCORRECT PER . THERE IS A NOTE IN REFERRAL ABOUT CONTINUOUS MONITORING AND INPATIENT EXAM. PLEASE ADVISE.

## 2021-02-11 NOTE — TELEPHONE ENCOUNTER
I have placed a new order. Though the order I initially placed was for an outpatient EEG, so I am not sure what the issue is. Thanks

## 2021-02-12 ENCOUNTER — HOSPITAL ENCOUNTER (OUTPATIENT)
Dept: MRI IMAGING | Facility: HOSPITAL | Age: 21
Discharge: HOME | End: 2021-02-12
Admitting: PHYSICIAN ASSISTANT

## 2021-02-12 PROCEDURE — 70551 MRI BRAIN STEM W/O DYE: CPT

## 2021-02-15 ENCOUNTER — HOSPITAL ENCOUNTER (OUTPATIENT)
Dept: NEUROLOGY | Facility: HOSPITAL | Age: 21
Discharge: HOME | End: 2021-02-15
Admitting: PHYSICIAN ASSISTANT

## 2021-02-15 PROCEDURE — 95819 EEG AWAKE AND ASLEEP: CPT

## 2021-08-24 ENCOUNTER — TELEPHONE (OUTPATIENT)
Dept: URGENT CARE | Facility: CLINIC | Age: 21
End: 2021-08-24

## 2021-08-24 NOTE — TELEPHONE ENCOUNTER
Patient called to report possible oral thrush secondary to Keflex use to treat ingrown toenail.  Advised patient to return to clinic for assessment.

## 2021-08-25 NOTE — PROGRESS NOTES
PULMONARY SERVICE INITIAL CONSULT NOTE    HPI:  This is a 70 y/o morbidly obese female with HTN, Hypercholesterolemia, known CAD with PCI of the LAD 3/22/21, diastolic CHF with EF 65% on 3/15/21, BIBA to [facility] with palpitations, SOB and SOB that started at rest. Pt was in bed when she started experiencing fast HR, pressure to both arms and chest which worsened to crushing chest pain that lasted over 2hrs and relieved with morphine in the ER.  Pt states that she has been feeling SOB, coughing yellow colored sputum for a few weeks and completed 3 rounds of antibiotics for a total of  24days without complete resolution. In the ER, /77, , R 20, T 100.1, O2 96% 3L; Labs significant for WBC 11.95, Creat 1.53 (Baseline creat 1.0), , trop <0.017, K 3.0. EKG with AFib with RVR @ 154bpm. She received Cardizem 30mg then 60mg with HR down to 103, Morphine, 1L of LR and she is transferred to [facility] for further management.    Pulmonary history:  Patient reports a chronic cough for more than 1 year of unclear etiology. She has had chronic cough which has not been associated with chest pressure or wheezing that she acknowledges. She reports having had sinus congestion and pressure with a sensation of drainage into the throat which prompts her coughing. She denies symptoms of acid reflux. she has a long smoking history for >45 pack years and is an active smoker. The cough has worsened as a result of her smoking. She denies ever having seen a pulmonologist nor has she had PFTs. She reports multiple episodes of "pneumonia" for which she has received abx, most recent was 1 week prior to admission. Her PCP has trialed inhalers but she is unclear which ones or why. She does not recall if they helped her cough. She does not have a history of childhood pulmonary complaints.     REVIEW OF SYSTEMS:  Constitutional: No fever, weight loss or fatigue  Eyes: No eye pain, visual disturbances, or discharge  ENMT:  No difficulty hearing, tinnitus, vertigo; No sinus or throat pain  Neck: No pain, stiffness or neck swelling  Respiratory: see HPI  Cardiovascular: No chest pain, palpitations, dizziness or leg swelling  Gastrointestinal: No abdominal or epigastric pain. No nausea, vomiting or hematemesis; No diarrhea or constipation. No melena or hematochezia.  Genitourinary: No dysuria, frequency, hematuria or incontinence  Neurological: No headaches, memory loss, loss of strength, numbness or tremors  Skin: No itching, burning, rashes or lesions   Lymph Nodes: No enlarged glands  Endocrine: No heat or cold intolerance; No hair loss  Musculoskeletal: No joint pain or swelling; No muscle, back or extremity pain  Psychiatric: No depression, anxiety, mood swings or difficulty sleeping  Heme/Lymph: No easy bruising or bleeding gums  Allergy and Immunologic: No hives or eczema    PAST MEDICAL & SURGICAL HISTORY:  Hepatitis C    Fibromyalgia    Herniated disc, cervical    Coronary artery disease    Colon polyp    Myocardial infarction, unspecified MI type, unspecified artery    HLD (hyperlipidemia)    HTN (hypertension)        FAMILY HISTORY:      SOCIAL HISTORY:  Smoking Status: [ x] Current, [ ] Former, [ ] Never  Pack Years:45     MEDICATIONS:  Pulmonary:    Antimicrobials:    Anticoagulants:  clopidogrel Tablet 75 milliGRAM(s) Oral daily  rivaroxaban 20 milliGRAM(s) Oral daily    Onc:    GI/:    Endocrine:  atorvastatin 40 milliGRAM(s) Oral at bedtime    Cardiac:  metoprolol succinate ER 50 milliGRAM(s) Oral daily  spironolactone 25 milliGRAM(s) Oral daily  torsemide 20 milliGRAM(s) Oral two times a day    Other Medications:  acetaminophen   Tablet .. 650 milliGRAM(s) Oral every 6 hours PRN  chlorhexidine 4% Liquid 1 Application(s) Topical once  potassium chloride    Tablet ER 40 milliEquivalent(s) Oral every 4 hours  sodium chloride 0.9%. 500 milliLiter(s) IV Continuous <Continuous>  venlafaxine XR. 300 milliGRAM(s) Oral daily      Allergies    No Known Allergies    Intolerances        Vital Signs Last 24 Hrs  T(C): 36.1 (25 Aug 2021 13:29), Max: 36.9 (25 Aug 2021 10:01)  T(F): 97 (25 Aug 2021 13:29), Max: 98.5 (25 Aug 2021 10:01)  HR: 92 (25 Aug 2021 05:23) (74 - 92)  BP: 106/72 (25 Aug 2021 05:23) (106/72 - 133/88)  BP(mean): 105 (24 Aug 2021 17:23) (105 - 105)  RR: 20 (25 Aug 2021 05:23) (20 - 21)  SpO2: 97% (25 Aug 2021 05:23) (96% - 97%)     @ 07: @ 07:00  --------------------------------------------------------  IN: 385 mL / OUT: 2400 mL / NET: - mL     @ 07: @ 14:49  --------------------------------------------------------  IN: 540 mL / OUT: 670 mL / NET: -130 mL          PHYSICAL EXAM:  Constitutional: WD  Head: NC/AT  EENT: PERRL, anicteric sclera; oropharynx clear, MMM  Neck: supple, no appreciable JVD  Respiratory: diminished, no audible wheezing   Cardiovascular: +S1/S2, tachycardic, regular   Gastrointestinal: soft, NT/ND  Extremities: WWP; no edema, clubbing or cyanosis  Vascular: 2+ radial pulses B/L  Neurological: awake and alert; PARIS    LABS:      CBC Full  -  ( 25 Aug 2021 06:56 )  WBC Count : 8.89 K/uL  RBC Count : 4.47 M/uL  Hemoglobin : 12.8 g/dL  Hematocrit : 40.3 %  Platelet Count - Automated : 330 K/uL  Mean Cell Volume : 90.2 fl  Mean Cell Hemoglobin : 28.6 pg  Mean Cell Hemoglobin Concentration : 31.8 gm/dL  Auto Neutrophil # : 6.23 K/uL  Auto Lymphocyte # : 1.39 K/uL  Auto Monocyte # : 0.87 K/uL  Auto Eosinophil # : 0.27 K/uL  Auto Basophil # : 0.07 K/uL  Auto Neutrophil % : 70.1 %  Auto Lymphocyte % : 15.6 %  Auto Monocyte % : 9.8 %  Auto Eosinophil % : 3.0 %  Auto Basophil % : 0.8 %        137  |  92<L>  |  48<H>  ----------------------------<  116<H>  3.0<L>   |  31  |  1.12    Ca    9.8      25 Aug 2021 06:53  Mg     2.3         TPro  8.2  /  Alb  3.6  /  TBili  0.3  /  DBili  x   /  AST  37  /  ALT  67<H>  /  AlkPhos  161<H>  08    PT/INR - ( 23 Aug 2021 20:27 )   PT: 17.6 sec;   INR: 1.52          PTT - ( 24 Aug 2021 15:05 )  PTT:102.9 sec      Urinalysis Basic - ( 24 Aug 2021 07:57 )    Color: Yellow / Appearance: Clear / S.010 / pH: x  Gluc: x / Ketone: NEGATIVE  / Bili: Negative / Urobili: 0.2 E.U./dL   Blood: x / Protein: NEGATIVE mg/dL / Nitrite: NEGATIVE   Leuk Esterase: NEGATIVE / RBC: x / WBC x   Sq Epi: x / Non Sq Epi: x / Bacteria: x    RADIOLOGY & ADDITIONAL STUDIES:    CT Chest w/o Cont   Impression:  1.  Since 2017, unchanged pulmonary nodules, most likely benign given long-term stability.  2.  Since April 3, 2021, unchanged large airways disease and mild emphysema.  3.  Unchanged borderline aneurysmal dilation mid ascending aorta.  4.  Unchanged hepatic steatosis.       Patient swabbed for COVID-19 using [employer] vendor but was not evaluated inside the clinic. Patient specimen collected in drive through in patients private vehicle due to order present from primary care provider. Patient was not evaluated by flu clinic provider. Preventive Cardiology Consultation Note    CHIEF COMPLAINT: s/p cardiac catheterization requiring cardiovascular prevention optimization and education    HISTORY OF PRESENT ILLNESS: 70 y/o morbidly obese female with HTN, Hypercholesterolemia, known CAD with PCI of the LAD 3/22/21, diastolic CHF with EF 65% on 3/15/21, BIBA to [facility] with c/o CP and SOB. Pt was in bed when she started experiencing fast HR, pressure to both arms and chest which worsened to crushing chest pain that lasted over 2hrs and relieved with morphine in the ER.  Pt states that she has been feeling SOB, coughing yellow colored sputum for a few weeks and completed 3 rounds of antibiotics for a total of 24 days without complete resolution. She was supposed to return for PCI of residual RCA 20-60% in July but did not because of upper respiratory infection. Patient is now S/P [facility] on 08/25/2021 - LM no significant disease, LAD stent widely patent, pLCx 30% calcified, mRCA 50-80% w/ IFR 0.97.      Review of systems otherwise negative.     Lifestyle History:  Mediterranean Diet Score (9 question survey) was 3-4.   (8-9: optimal, 6-7: near-optimal, 4-5: suboptimal, 0-3: markedly suboptimal)  Exercise: Patient reports exercising at a moderate level for <30 minutes per week. She reports being very limited with exercise capacity due to musculoskeletal complaints and reports she can barely get up and down the stairs in her 6th floor walk-up.   Smoking: Current smoker (1 PPD x 44 years). She is motivated to try to quit, but needs assistance.   Stress: Patient denies any stress.     PAST MEDICAL & SURGICAL HISTORY:  Hepatitis C    Fibromyalgia    Herniated disc, cervical    Coronary artery disease    Colon polyp    Myocardial infarction, unspecified MI type, unspecified artery    HLD (hyperlipidemia)    HTN (hypertension)      FAMILY HISTORY:   Patient denies any first degree family history of premature CAD or CVA.     Allergies:   No Known Allergies      HOME MEDICATIONS:   Aldactone 25 mg oral tablet: 1 tab(s) orally once a day (26 Aug 2021 14:29)  fluticasone 50 mcg/inh nasal spray: 1 spray(s) nasal 2 times a day, As needed, nasal congestion (26 Aug 2021 14:29)  Lipitor 40 mg oral tablet: 1 tab(s) orally once a day (24 Aug 2021 04:25)  losartan 50 mg oral tablet: 1 tab(s) orally once a day (24 Aug 2021 04:25)  metoprolol succinate 50 mg oral tablet, extended release: 1 tab(s) orally once a day (24 Aug 2021 04:25)  pantoprazole 40 mg oral delayed release tablet: 1 tab(s) orally once a day (before a meal) (26 Aug 2021 14:29)  Plavix 75 mg oral tablet: 1 tab(s) orally once a day (24 Aug 2021 04:25)  rivaroxaban 20 mg oral tablet: 1 tab(s) orally once a day (26 Aug 2021 14:29)  torsemide 20 mg oral tablet: 2 tab(s) orally once a day (26 Aug 2021 14:29)  venlafaxine 150 mg oral capsule, extended release: 2 cap(s) orally once a day (26 Aug 2021 14:29)      PHYSICAL EXAM:  T(C): 36.9 (08-26-21 @ 13:30), Max: 37.1 (08-26-21 @ 06:06)  T(F): 98.5 (08-26-21 @ 13:30), Max: 98.7 (08-26-21 @ 06:06)  HR: 82 (08-26-21 @ 12:45) (82 - 101)  BP: 121/92 (08-26-21 @ 12:45) (117/90 - 132/62)  RR: 20 (08-26-21 @ 12:45) (20 - 23)  SpO2: 96% (08-26-21 @ 12:45) (96% - 97%)  Height (cm): 157.5 (08-24 @ 03:02)  Weight (kg): 120.2 (08-24 @ 03:02)  BMI (kg/m2): 48.5 (08-24 @ 03:02)  	  Gen- awake, conversive  Head-NCAT; eyes: no xanthelasmas   Neck- no JVD, no carotid bruit b/l  Respiratory- respirations non-labored; clear to auscultation b/l   Cardiovascular- S1S2, RRR, no murmur  Neurology- alert and oriented x 3, no focal deficits  Psych- normal affect; appearance, verbalizations, behaviors are appropriate   Skin- no lesions, no rashes, no xanthomas     LABS:	                          12.8   8.89  )-----------( 330      ( 25 Aug 2021 06:56 )             40.3     08-25    137  |  92<L>  |  48<H>  ----------------------------<  116<H>  3.0<L>   |  31  |  1.12    Ca    9.8      25 Aug 2021 06:53  Mg     2.3     08-25      ASSESSMENT/RECOMMENDATIONS: 	  Patient's dietary, exercise and overall lifestyle habits were reviewed. The concept of atherosclerosis and its systemic nature was discussed with a focus on the need to get all cardiovascular risk factors to goal. At this time, I would like to make the following recommendations to optimize atherosclerotic risk factors.     RECOMMENDATIONS:   Anti-platelet Therapy: APT per interventionalist recommendation.   Lipid Therapy: Patient is currently taking atorvastatin 40mg. While there are no recent lipid results to review, if LDL-C remains >70mg/dL, along with lifestyle modifications, we believe it would be reasonable to increase the statin dosage for further LDL lowering.  Mediterranean Diet Score is 3-4. Some suggestions include continue incorporating 2 or more servings per day of vegetables, fruits, and whole grains. Increase intake of fish and legumes/beans to 2 or more servings per week. Aim to increase intake of healthy fats, such as olive oil and avocados, and have a handful of nuts/seeds most days. Reduce red/processed meat consumption to 2 or fewer times per week.   Exercise: Recommended gradually increasing activity to 30-45 minutes most days of the week once cleared by referring cardiologist. Cardiac rehab might benefit this patient and is covered by major insurance plans (other than co-pays) and would be covered based on her previous PCI in March of this year.    Medication Adherence: Patient has no issues with adherence at this time.   Smoking: Smoking cessation was strongly encouraged and discussed with the patient. We suggested picking a day in the future that will be the planned quit date. We recommend following up with her PCP for further assistance, if needed for nicotine replacement therapy. The risks to overall health if she continues to smoke were discussed, and patient expressed understanding.   Obesity/Overweight: The patient was advised about specific mechanisms such as reduced portions and increased activity for efforts toward weight loss.   Glucometabolic State: A recent HbA1c was unavailable. Recommend following up in the outpatient setting for continued monitoring. However, based on her obesity and CAD, it would be reasonable to consider a GLP1-RA, regardless of her glucose control.   Sleep Apnea: The patient is at low risk for sleep apnea. She reports having trouble sleeping for more than a few hours at a time, but this is due to musculoskeletal aches and pains rather than breathing issues.   Psychological Stress: The patient appears to be coping with stressors well at this time.     Thank you for the opportunity to see this patient. Please feel free to contact Prevention if there are any questions, or if you feel that your patient would benefit from continued follow-up visits with the Program.    Salma Shaw, AGNP-BC  Cardiovascular Prevention     Noemy Muller MD  System Director, Cardiovascular Prevention

## 2022-06-28 PROCEDURE — 87109 MYCOPLASMA: CPT | Performed by: NURSE PRACTITIONER

## 2022-06-28 PROCEDURE — 87591 N.GONORRHOEAE DNA AMP PROB: CPT | Performed by: NURSE PRACTITIONER

## 2022-06-28 PROCEDURE — 87491 CHLMYD TRACH DNA AMP PROBE: CPT | Performed by: NURSE PRACTITIONER

## 2022-06-28 PROCEDURE — 87661 TRICHOMONAS VAGINALIS AMPLIF: CPT | Performed by: NURSE PRACTITIONER

## 2022-06-28 PROCEDURE — 87086 URINE CULTURE/COLONY COUNT: CPT | Performed by: NURSE PRACTITIONER

## 2025-01-17 ENCOUNTER — HOSPITAL ENCOUNTER (EMERGENCY)
Facility: HOSPITAL | Age: 25
Discharge: HOME | End: 2025-01-17
Attending: STUDENT IN AN ORGANIZED HEALTH CARE EDUCATION/TRAINING PROGRAM
Payer: COMMERCIAL

## 2025-01-17 ENCOUNTER — APPOINTMENT (OUTPATIENT)
Facility: HOSPITAL | Age: 25
End: 2025-01-17
Payer: COMMERCIAL

## 2025-01-17 VITALS
WEIGHT: 200 LBS | TEMPERATURE: 98.7 F | BODY MASS INDEX: 24.36 KG/M2 | RESPIRATION RATE: 16 BRPM | SYSTOLIC BLOOD PRESSURE: 149 MMHG | HEART RATE: 99 BPM | DIASTOLIC BLOOD PRESSURE: 79 MMHG | OXYGEN SATURATION: 99 % | HEIGHT: 76 IN

## 2025-01-17 DIAGNOSIS — L02.216 ABSCESS OF UMBILICUS: Primary | ICD-10-CM

## 2025-01-17 LAB
ALBUMIN SERPL-MCNC: 4.9 G/DL (ref 3.5–5.2)
ALBUMIN/GLOB SERPL: 1.7 G/DL
ALP SERPL-CCNC: 76 U/L (ref 39–117)
ALT SERPL W P-5'-P-CCNC: 13 U/L (ref 1–41)
ANION GAP SERPL CALCULATED.3IONS-SCNC: 12.9 MMOL/L (ref 5–15)
AST SERPL-CCNC: 19 U/L (ref 1–40)
BASOPHILS # BLD AUTO: 0.04 10*3/MM3 (ref 0–0.2)
BASOPHILS NFR BLD AUTO: 0.5 % (ref 0–1.5)
BILIRUB SERPL-MCNC: 0.3 MG/DL (ref 0–1.2)
BILIRUB UR QL STRIP: NEGATIVE
BUN SERPL-MCNC: 13 MG/DL (ref 6–20)
BUN/CREAT SERPL: 12.7 (ref 7–25)
CALCIUM SPEC-SCNC: 9.2 MG/DL (ref 8.6–10.5)
CHLORIDE SERPL-SCNC: 102 MMOL/L (ref 98–107)
CLARITY UR: CLEAR
CO2 SERPL-SCNC: 24.1 MMOL/L (ref 22–29)
COLOR UR: YELLOW
CREAT SERPL-MCNC: 1.02 MG/DL (ref 0.76–1.27)
DEPRECATED RDW RBC AUTO: 37.1 FL (ref 37–54)
EGFRCR SERPLBLD CKD-EPI 2021: 104.6 ML/MIN/1.73
EOSINOPHIL # BLD AUTO: 0.1 10*3/MM3 (ref 0–0.4)
EOSINOPHIL NFR BLD AUTO: 1.3 % (ref 0.3–6.2)
ERYTHROCYTE [DISTWIDTH] IN BLOOD BY AUTOMATED COUNT: 12.1 % (ref 12.3–15.4)
GLOBULIN UR ELPH-MCNC: 2.9 GM/DL
GLUCOSE SERPL-MCNC: 91 MG/DL (ref 65–99)
GLUCOSE UR STRIP-MCNC: NEGATIVE MG/DL
HCT VFR BLD AUTO: 44.4 % (ref 37.5–51)
HGB BLD-MCNC: 14.9 G/DL (ref 13–17.7)
HGB UR QL STRIP.AUTO: NEGATIVE
IMM GRANULOCYTES # BLD AUTO: 0.01 10*3/MM3 (ref 0–0.05)
IMM GRANULOCYTES NFR BLD AUTO: 0.1 % (ref 0–0.5)
KETONES UR QL STRIP: ABNORMAL
LEUKOCYTE ESTERASE UR QL STRIP.AUTO: NEGATIVE
LIPASE SERPL-CCNC: 27 U/L (ref 13–60)
LYMPHOCYTES # BLD AUTO: 1.85 10*3/MM3 (ref 0.7–3.1)
LYMPHOCYTES NFR BLD AUTO: 23.2 % (ref 19.6–45.3)
MCH RBC QN AUTO: 27.5 PG (ref 26.6–33)
MCHC RBC AUTO-ENTMCNC: 33.6 G/DL (ref 31.5–35.7)
MCV RBC AUTO: 81.9 FL (ref 79–97)
MONOCYTES # BLD AUTO: 0.81 10*3/MM3 (ref 0.1–0.9)
MONOCYTES NFR BLD AUTO: 10.2 % (ref 5–12)
NEUTROPHILS NFR BLD AUTO: 5.15 10*3/MM3 (ref 1.7–7)
NEUTROPHILS NFR BLD AUTO: 64.7 % (ref 42.7–76)
NITRITE UR QL STRIP: NEGATIVE
PH UR STRIP.AUTO: 5.5 [PH] (ref 5–8)
PLATELET # BLD AUTO: 170 10*3/MM3 (ref 140–450)
PMV BLD AUTO: 11.4 FL (ref 6–12)
POTASSIUM SERPL-SCNC: 3.7 MMOL/L (ref 3.5–5.2)
PROT SERPL-MCNC: 7.8 G/DL (ref 6–8.5)
PROT UR QL STRIP: NEGATIVE
RBC # BLD AUTO: 5.42 10*6/MM3 (ref 4.14–5.8)
SODIUM SERPL-SCNC: 139 MMOL/L (ref 136–145)
SP GR UR STRIP: >=1.03 (ref 1–1.03)
UROBILINOGEN UR QL STRIP: ABNORMAL
WBC NRBC COR # BLD AUTO: 7.96 10*3/MM3 (ref 3.4–10.8)

## 2025-01-17 PROCEDURE — 83690 ASSAY OF LIPASE: CPT | Performed by: PHYSICIAN ASSISTANT

## 2025-01-17 PROCEDURE — 80053 COMPREHEN METABOLIC PANEL: CPT | Performed by: PHYSICIAN ASSISTANT

## 2025-01-17 PROCEDURE — 85025 COMPLETE CBC W/AUTO DIFF WBC: CPT | Performed by: PHYSICIAN ASSISTANT

## 2025-01-17 PROCEDURE — 74177 CT ABD & PELVIS W/CONTRAST: CPT

## 2025-01-17 PROCEDURE — 99285 EMERGENCY DEPT VISIT HI MDM: CPT

## 2025-01-17 PROCEDURE — 25510000001 IOPAMIDOL 61 % SOLUTION: Performed by: STUDENT IN AN ORGANIZED HEALTH CARE EDUCATION/TRAINING PROGRAM

## 2025-01-17 PROCEDURE — 81003 URINALYSIS AUTO W/O SCOPE: CPT | Performed by: PHYSICIAN ASSISTANT

## 2025-01-17 RX ORDER — SODIUM CHLORIDE 0.9 % (FLUSH) 0.9 %
10 SYRINGE (ML) INJECTION AS NEEDED
Status: DISCONTINUED | OUTPATIENT
Start: 2025-01-17 | End: 2025-01-17 | Stop reason: HOSPADM

## 2025-01-17 RX ORDER — IOPAMIDOL 612 MG/ML
100 INJECTION, SOLUTION INTRAVASCULAR
Status: COMPLETED | OUTPATIENT
Start: 2025-01-17 | End: 2025-01-17

## 2025-01-17 RX ADMIN — IOPAMIDOL 100 ML: 612 INJECTION, SOLUTION INTRAVENOUS at 17:06

## 2025-01-17 NOTE — DISCHARGE INSTRUCTIONS
You are being discharged home with 1 medication.  Take this medication as prescribed.  Return to emergency department may worsen.  Please follow-up with your primary care provider for further evaluation

## 2025-01-17 NOTE — FSED PROVIDER NOTE
Subjective  History of Present Illness:    This is a 24-year-old female with history of lidocaine male who presents with complaints of periumbilical pain with drainage out of his umbilicus.  Patient states that symptoms started 3 days ago and have gotten progressively worse.  No fevers, no nausea or vomiting, no diarrhea.  He has had previous laparoscopic cholecystectomy in the past.  No other abdominal surgical history.  He states that pain is radiating down to the right lower quadrant.  He has no significant past medical history.      Nurses Notes reviewed and agree, including vitals, allergies, social history and prior medical history.     REVIEW OF SYSTEMS: All systems reviewed and not pertinent unless noted.  Review of Systems    Past Medical History:   Diagnosis Date    ADD (attention deficit disorder)     Asthma     Hematochezia     IBS (irritable bowel syndrome)        Allergies:    Patient has no known allergies.      Past Surgical History:   Procedure Laterality Date    CHOLECYSTECTOMY  12/21/2016         Social History     Socioeconomic History    Marital status: Single   Tobacco Use    Smoking status: Never     Passive exposure: Never    Smokeless tobacco: Never   Vaping Use    Vaping status: Never Used   Substance and Sexual Activity    Alcohol use: Yes     Comment: 1-2 week     Drug use: Never    Sexual activity: Defer         Family History   Problem Relation Age of Onset    No Known Problems Mother     No Known Problems Father     No Known Problems Sister     No Known Problems Brother     No Known Problems Maternal Aunt     No Known Problems Maternal Uncle     No Known Problems Paternal Aunt     No Known Problems Paternal Uncle     No Known Problems Maternal Grandmother     No Known Problems Maternal Grandfather     No Known Problems Paternal Grandmother     No Known Problems Paternal Grandfather     No Known Problems Other     Colon cancer Neg Hx     Colon polyps Neg Hx     Ataxia Neg Hx     Chorea  "Neg Hx     Dementia Neg Hx     Mental retardation Neg Hx     Migraines Neg Hx     Multiple sclerosis Neg Hx     Neurofibromatosis Neg Hx     Neuropathy Neg Hx     Parkinsonism Neg Hx     Seizures Neg Hx     Stroke Neg Hx        Objective  Physical Exam:  /79   Pulse 99   Temp 98.7 °F (37.1 °C) (Oral)   Resp 16   Ht 193 cm (76\")   Wt 90.7 kg (200 lb)   SpO2 99%   BMI 24.34 kg/m²      Physical Exam  Vitals and nursing note reviewed.   Constitutional:       Appearance: He is well-developed.   HENT:      Head: Normocephalic and atraumatic.   Cardiovascular:      Rate and Rhythm: Normal rate and regular rhythm.   Pulmonary:      Effort: Pulmonary effort is normal.      Breath sounds: Normal breath sounds.   Abdominal:      General: Abdomen is flat.      Palpations: Abdomen is soft.      Hernia: No hernia is present.      Comments: Serosanguineous drainage from the umbilicus   Skin:     General: Skin is warm.   Neurological:      Mental Status: He is alert.         Procedures    ED Course:         Lab Results (last 24 hours)       Procedure Component Value Units Date/Time    CBC & Differential [[id number]]  (Abnormal) Collected: 01/17/25 1644    Specimen: Blood Updated: 01/17/25 1657    Narrative:      The following orders were created for panel order CBC & Differential.  Procedure                               Abnormality         Status                     ---------                               -----------         ------                     CBC Auto Differential[[id number]]        Abnormal            Final result                 Please view results for these tests on the individual orders.    Comprehensive Metabolic Panel [[id number]] Collected: 01/17/25 1644    Specimen: Blood Updated: 01/17/25 1712     Glucose 91 mg/dL      BUN 13 mg/dL      Creatinine 1.02 mg/dL      Sodium 139 mmol/L      Potassium 3.7 mmol/L      Chloride 102 mmol/L      CO2 24.1 mmol/L      Calcium 9.2 mg/dL      Total Protein 7.8 g/dL  "     Albumin 4.9 g/dL      ALT (SGPT) 13 U/L      AST (SGOT) 19 U/L      Alkaline Phosphatase 76 U/L      Total Bilirubin 0.3 mg/dL      Globulin 2.9 gm/dL      A/G Ratio 1.7 g/dL      BUN/Creatinine Ratio 12.7     Anion Gap 12.9 mmol/L      eGFR 104.6 mL/min/1.73     Narrative:      GFR Categories in Chronic Kidney Disease (CKD)      GFR Category          GFR (mL/min/1.73)    Interpretation  G1                     90 or greater         Normal or high (1)  G2                      60-89                Mild decrease (1)  G3a                   45-59                Mild to moderate decrease  G3b                   30-44                Moderate to severe decrease  G4                    15-29                Severe decrease  G5                    14 or less           Kidney failure          (1)In the absence of evidence of kidney disease, neither GFR category G1 or G2 fulfill the criteria for CKD.    eGFR calculation 2021 CKD-EPI creatinine equation, which does not include race as a factor    Lipase [[id number]]  (Normal) Collected: 01/17/25 1644    Specimen: Blood Updated: 01/17/25 1712     Lipase 27 U/L     CBC Auto Differential [[id number]]  (Abnormal) Collected: 01/17/25 1644    Specimen: Blood Updated: 01/17/25 1657     WBC 7.96 10*3/mm3      RBC 5.42 10*6/mm3      Hemoglobin 14.9 g/dL      Hematocrit 44.4 %      MCV 81.9 fL      MCH 27.5 pg      MCHC 33.6 g/dL      RDW 12.1 %      RDW-SD 37.1 fl      MPV 11.4 fL      Platelets 170 10*3/mm3      Neutrophil % 64.7 %      Lymphocyte % 23.2 %      Monocyte % 10.2 %      Eosinophil % 1.3 %      Basophil % 0.5 %      Immature Grans % 0.1 %      Neutrophils, Absolute 5.15 10*3/mm3      Lymphocytes, Absolute 1.85 10*3/mm3      Monocytes, Absolute 0.81 10*3/mm3      Eosinophils, Absolute 0.10 10*3/mm3      Basophils, Absolute 0.04 10*3/mm3      Immature Grans, Absolute 0.01 10*3/mm3     Urinalysis With Microscopic If Indicated (No Culture) - Urine, Clean Catch [[id number]]   (Abnormal) Collected: 01/17/25 1648    Specimen: Urine, Clean Catch Updated: 01/17/25 1655     Color, UA Yellow     Appearance, UA Clear     pH, UA 5.5     Specific Gravity, UA >=1.030     Glucose, UA Negative     Ketones, UA 15 mg/dL (1+)     Bilirubin, UA Negative     Blood, UA Negative     Protein, UA Negative     Leuk Esterase, UA Negative     Nitrite, UA Negative     Urobilinogen, UA 0.2 E.U./dL    Narrative:      Urine microscopic not indicated.             CT Abdomen Pelvis With Contrast    Result Date: 1/17/2025  CT ABDOMEN PELVIS W CONTRAST Date of Exam: 1/17/2025 4:51 PM EST Indication: Umbilical pain with drainage. Comparison: CT of the abdomen and pelvis dated 9/25/2019 Technique: Axial CT images were obtained of the abdomen and pelvis following the uneventful intravenous administration of 100 mL Isovue 300. Reconstructed coronal and sagittal images were also obtained. Automated exposure control and iterative construction methods were used. Findings: Liver: The liver is unremarkable in morphology. No focal liver lesion is seen. No biliary dilation is seen. Gallbladder: Surgically absent Pancreas: Unremarkable. Spleen: Unremarkable. Adrenal glands: Unremarkable. Genitourinary tract: Kidneys are unremarkable. No hydronephrosis is seen. The visualized portions of the ureters, urinary bladder, and prostate gland appear unremarkable. Gastrointestinal tract: The visualized hollow viscera demonstrate no focal lesion. There is no evidence of bowel obstruction. Appendix: No findings to suggest acute appendicitis. Other findings: No free air or free fluid is identified. No pathologically enlarged lymph nodes are seen. The abdominal aorta and IVC appear unremarkable. Bones and soft tissues: No acute or suspicious osseous or soft tissue lesion is identified. Lung bases: The visualized lung bases are clear.     Impression: Impression: 1.No acute abnormality identified within the abdomen or pelvis. 2.No specific  abnormality noted in the periumbilical soft tissues. Electronically Signed: Evens Lara MD  1/17/2025 5:12 PM EST  Workstation ID: [id number]        [facility]      Initial impression of presenting illness: This is a 24-year-old male who presents with complaints of periumbilical pain.  Patient has mild amount of drainage on exam.  CT scan did not reveal any acute findings.  Going to arrange this with Augmentin.  Discussed return precautions.  Discussed findings and plan of care with the patient who is agreeable to discharge.     DDX: includes but is not limited to: Appendicitis, periumbilical abscess, hernia        Medications   sodium chloride 0.9 % flush 10 mL (has no administration in time range)   iopamidol (ISOVUE-300) 61 % injection 100 mL (100 mL Intravenous Given 1/17/25 1706)         Data interpreted: Nursing notes reviewed, vital signs reviewed.  Labs independently interpreted by me (CBC, CMP, lipase, UA, troponin, ABG, lactic acid, procalcitonin).  Imaging independently interpreted by me (x-ray, CT scan).  EKG independently interpreted by me.  O2 saturation:    Counseling: Discussed the results above with the patient regarding need for admission or discharge.  Patient understands and agrees plan of care.      -----  ED Disposition       ED Disposition   Discharge    Condition   Stable    Comment   --             Final diagnoses:   Abscess of umbilicus      Your Follow-Up Providers       Schedule an appointment as soon as possible for a visit  with Ricardo York MD.    Specialty: General Surgery  [address]  [address]  [facility] [address]  [phone]               Go to  [facility] EMERGENCY DEPARTMENT [facility].    Specialty: Emergency Medicine  Follow up details: As needed, If symptoms worsen  [address]  [facility] [address] [phone]                     Contact information for after-discharge care    Follow-up information has not been  specified.                    Your medication list        START taking these medications        Instructions Last Dose Given Next Dose Due   amoxicillin-clavulanate 875-125 MG per tablet  Commonly known as: AUGMENTIN      Take 1 tablet by mouth 2 (Two) Times a Day.              CONTINUE taking these medications        Instructions Last Dose Given Next Dose Due   albuterol sulfate  (90 Base) MCG/ACT inhaler  Commonly known as: PROVENTIL HFA;VENTOLIN HFA;PROAIR HFA      INHALE 1 PUFF AS NEEDED FOR SHORTNESS OF BREATH/WHEEZING       amphetamine-dextroamphetamine XR 20 MG 24 hr capsule  Commonly known as: ADDERALL XR      Take 1 capsule by mouth Every Morning                 Where to Get Your Medications        Information about where to get these medications is not yet available    Ask your nurse or doctor about these medications  amoxicillin-clavulanate 875-125 MG per tablet          Statement Selected

## 2025-01-28 ENCOUNTER — OFFICE VISIT (OUTPATIENT)
Age: 25
End: 2025-01-28
Payer: COMMERCIAL

## 2025-01-28 VITALS
DIASTOLIC BLOOD PRESSURE: 80 MMHG | SYSTOLIC BLOOD PRESSURE: 128 MMHG | HEIGHT: 77 IN | OXYGEN SATURATION: 98 % | HEART RATE: 68 BPM | WEIGHT: 211.2 LBS | BODY MASS INDEX: 24.94 KG/M2

## 2025-01-28 DIAGNOSIS — R19.09 UMBILICAL SWELLING: Primary | ICD-10-CM

## 2025-01-28 PROCEDURE — 99213 OFFICE O/P EST LOW 20 MIN: CPT | Performed by: STUDENT IN AN ORGANIZED HEALTH CARE EDUCATION/TRAINING PROGRAM

## 2025-01-30 NOTE — PROGRESS NOTES
Office Note     Name: Robert Molina    : 2000     MRN: [id number]     Chief Complaint  new patient ([facility]) and Abdominal Pain (swelling)    Subjective     History of Present Illness:  Robert Molina is a 24 y.o. male who presents today for initial visit to [facility]. He has complaints of abdominal pain.  He is concerned for swelling around the umbilicus.  No fevers or systemic symtpoms    Past Medical History:   Past Medical History:   Diagnosis Date    ADD (attention deficit disorder)     Asthma     Hematochezia     IBS (irritable bowel syndrome)        Past Surgical History:   Past Surgical History:   Procedure Laterality Date    CHOLECYSTECTOMY  2016       Immunizations:   Immunization History   Administered Date(s) Administered    -influenza Vac Quardvalent Preservativ 12/10/2019    DTaP, Unspecified 2001    Flu Vaccine Split Quad 10/26/2015, 10/26/2016, 10/31/2017, 2018    Fluzone  >6mos 2011, 2012    Fluzone (or Fluarix & Flulaval for VFC) >6mos 10/26/2015, 10/26/2016, 10/31/2017, 2018    HPV Quadrivalent 2011, 2011, 2011    Hep A, 2 Dose 2012    HiB 2001    IPV 2001    MCV4 Unspecified 2011    MMR 2001    Tdap 2011    flucelvax quad pfs =>4 YRS 2020        Medications:     Current Outpatient Medications:     albuterol sulfate  (90 Base) MCG/ACT inhaler, INHALE 1 PUFF AS NEEDED FOR SHORTNESS OF BREATH/WHEEZING, Disp: , Rfl:     amphetamine-dextroamphetamine XR (ADDERALL XR) 20 MG 24 hr capsule, Take 1 capsule by mouth Every Morning, Disp: , Rfl:     amoxicillin-clavulanate (AUGMENTIN) 875-125 MG per tablet, Take 1 tablet by mouth 2 (Two) Times a Day. (Patient not taking: Reported on 2025), Disp: 14 tablet, Rfl: 0    Allergies:   No Known Allergies    Family History:   Family History   Problem Relation Age of Onset    No Known Problems Mother      "Hyperlipidemia Father     No Known Problems Sister     No Known Problems Brother     No Known Problems Maternal Aunt     No Known Problems Maternal Uncle     No Known Problems Paternal Aunt     No Known Problems Paternal Uncle     No Known Problems Maternal Grandmother     No Known Problems Maternal Grandfather     Hypertension Paternal Grandmother     No Known Problems Paternal Grandfather     No Known Problems Other     Colon cancer Neg Hx     Colon polyps Neg Hx     Ataxia Neg Hx     Chorea Neg Hx     Dementia Neg Hx     Mental retardation Neg Hx     Migraines Neg Hx     Multiple sclerosis Neg Hx     Neurofibromatosis Neg Hx     Neuropathy Neg Hx     Parkinsonism Neg Hx     Seizures Neg Hx     Stroke Neg Hx        Social History:   Social History     Socioeconomic History    Marital status: Single   Tobacco Use    Smoking status: Never     Passive exposure: Never    Smokeless tobacco: Never   Vaping Use    Vaping status: Never Used   Substance and Sexual Activity    Alcohol use: Yes     Comment: 1-2 week     Drug use: Never    Sexual activity: Defer         Objective     Vital Signs  /80 (BP Location: Left arm, Patient Position: Sitting, Cuff Size: Adult)   Pulse 68   Ht 196.2 cm (77.24\")   Wt 95.8 kg (211 lb 3.2 oz)   SpO2 98%   BMI 24.89 kg/m²   Estimated body mass index is 24.89 kg/m² as calculated from the following:    Height as of this encounter: 196.2 cm (77.24\").    Weight as of this encounter: 95.8 kg (211 lb 3.2 oz).    BMI is within normal parameters. No other follow-up for BMI required.      Physical Exam  Constitutional:       General: He is not in acute distress.     Appearance: He is not toxic-appearing.   Cardiovascular:      Rate and Rhythm: Normal rate and regular rhythm.      Heart sounds: No murmur heard.     No friction rub. No gallop.   Pulmonary:      Effort: Pulmonary effort is normal.      Breath sounds: Normal breath sounds.   Abdominal:      General: Abdomen is flat. There is " no distension.      Comments: Inspected umbilicus using forceps and gauze.  No mass, fluctuance, pus, or erythema noted   Skin:     General: Skin is warm and dry.   Neurological:      Mental Status: He is alert.   Psychiatric:         Mood and Affect: Mood normal.         Behavior: Behavior normal.          Assessment and Plan     1. Umbilical swelling  I do not see any evidence of infection on his umbilicus. CT was negative, patient has no systemic symptoms and took augmentin  I don't recommend additional imaging or treatment at this time.        Counseling was given to patient for the following topics: instructions for management.    Follow Up  Return in about 1 year (around 1/28/2026) for Annual physical.    MD MIT[facility]  [facility] [address] [phone]

## 2025-03-27 ENCOUNTER — OFFICE VISIT (OUTPATIENT)
Age: 25
End: 2025-03-27
Payer: COMMERCIAL

## 2025-03-27 ENCOUNTER — LAB (OUTPATIENT)
Age: 25
End: 2025-03-27
Payer: COMMERCIAL

## 2025-03-27 VITALS
WEIGHT: 220 LBS | SYSTOLIC BLOOD PRESSURE: 122 MMHG | HEIGHT: 77 IN | OXYGEN SATURATION: 98 % | BODY MASS INDEX: 25.98 KG/M2 | DIASTOLIC BLOOD PRESSURE: 82 MMHG | HEART RATE: 68 BPM | RESPIRATION RATE: 18 BRPM

## 2025-03-27 DIAGNOSIS — K58.1 IRRITABLE BOWEL SYNDROME WITH CONSTIPATION: ICD-10-CM

## 2025-03-27 DIAGNOSIS — R10.9 ABDOMINAL PAIN, UNSPECIFIED ABDOMINAL LOCATION: Primary | ICD-10-CM

## 2025-03-27 DIAGNOSIS — R10.9 ABDOMINAL PAIN, UNSPECIFIED ABDOMINAL LOCATION: ICD-10-CM

## 2025-03-27 PROCEDURE — 99213 OFFICE O/P EST LOW 20 MIN: CPT | Performed by: STUDENT IN AN ORGANIZED HEALTH CARE EDUCATION/TRAINING PROGRAM

## 2025-03-27 NOTE — PROGRESS NOTES
Office Note     Name: Robert Molina    : 2000     MRN: [id number]     Chief Complaint  Constipation and Abdominal Pain (Bloating and discomfort in lower left side of abdomin )    Subjective     History of Present Illness:  Robert Molina is a 24 y.o. male who presents today for acute visit for constipation.  He has noted worsened constipation in the last couple of weeks without any changes other than being under more stress lately. He has left sided abdominal pain with this.  Has tried dulcolax with partial benefit.  Has been dx with IBS in past.      Past Medical History:   Past Medical History:   Diagnosis Date    ADD (attention deficit disorder)     Asthma     Hematochezia     IBS (irritable bowel syndrome)        Past Surgical History:   Past Surgical History:   Procedure Laterality Date    CHOLECYSTECTOMY  2016       Immunizations:   Immunization History   Administered Date(s) Administered    -influenza Vac Quardvalent Preservativ 12/10/2019    DTaP, Unspecified 2001    Flu Vaccine Split Quad 10/26/2015, 10/26/2016, 10/31/2017, 2018    Fluzone  >6mos 2011, 2012    Fluzone (or Fluarix & Flulaval for VFC) >6mos 10/26/2015, 10/26/2016, 10/31/2017, 2018    HPV Quadrivalent 2011, 2011, 2011    Hep A, 2 Dose 2012    HiB 2001    IPV 2001    MCV4 Unspecified 2011    MMR 2001    Tdap 2011    flucelvax quad pfs =>4 YRS 2020        Medications:     Current Outpatient Medications:     albuterol sulfate  (90 Base) MCG/ACT inhaler, INHALE 1 PUFF AS NEEDED FOR SHORTNESS OF BREATH/WHEEZING, Disp: , Rfl:     amphetamine-dextroamphetamine XR (ADDERALL XR) 20 MG 24 hr capsule, Take 1 capsule by mouth Every Morning, Disp: , Rfl:     amoxicillin-clavulanate (AUGMENTIN) 875-125 MG per tablet, Take 1 tablet by mouth 2 (Two) Times a Day. (Patient not taking: Reported on 3/27/2025), Disp: 14  "tablet, Rfl: 0    Allergies:   No Known Allergies    Family History:   Family History   Problem Relation Age of Onset    No Known Problems Mother     Hyperlipidemia Father     No Known Problems Sister     No Known Problems Brother     No Known Problems Maternal Aunt     No Known Problems Maternal Uncle     No Known Problems Paternal Aunt     No Known Problems Paternal Uncle     No Known Problems Maternal Grandmother     No Known Problems Maternal Grandfather     Hypertension Paternal Grandmother     No Known Problems Paternal Grandfather     No Known Problems Other     Colon cancer Neg Hx     Colon polyps Neg Hx     Ataxia Neg Hx     Chorea Neg Hx     Dementia Neg Hx     Mental retardation Neg Hx     Migraines Neg Hx     Multiple sclerosis Neg Hx     Neurofibromatosis Neg Hx     Neuropathy Neg Hx     Parkinsonism Neg Hx     Seizures Neg Hx     Stroke Neg Hx        Social History:   Social History     Socioeconomic History    Marital status: Single   Tobacco Use    Smoking status: Never     Passive exposure: Never    Smokeless tobacco: Never   Vaping Use    Vaping status: Never Used   Substance and Sexual Activity    Alcohol use: Yes     Comment: 1-2 week     Drug use: Never    Sexual activity: Defer         Objective     Vital Signs  /82 (BP Location: Left arm, Patient Position: Sitting, Cuff Size: Adult)   Pulse 68   Resp 18   Ht 196.2 cm (77.24\")   Wt 99.8 kg (220 lb)   SpO2 98%   BMI 25.92 kg/m²   Estimated body mass index is 25.92 kg/m² as calculated from the following:    Height as of this encounter: 196.2 cm (77.24\").    Weight as of this encounter: 99.8 kg (220 lb).          Physical Exam  Constitutional:       General: He is not in acute distress.     Appearance: He is not toxic-appearing.   Cardiovascular:      Rate and Rhythm: Normal rate and regular rhythm.      Heart sounds: No murmur heard.     No friction rub. No gallop.   Pulmonary:      Effort: Pulmonary effort is normal.      Breath " sounds: Normal breath sounds.   Abdominal:      General: Abdomen is flat. There is no distension.   Skin:     General: Skin is warm and dry.   Neurological:      Mental Status: He is alert.   Psychiatric:         Mood and Affect: Mood normal.         Behavior: Behavior normal.          Assessment and Plan     1. Abdominal pain, unspecified abdominal location  Check calprotectin, CT A/P negative in 2025  No other red flags, pain is fluctuating  - Calprotectin, Fecal - Stool, Per Rectum; Future    2. Irritable bowel syndrome with constipation  Acute constipation at this time.  Advised to use miralax. Sample of linzess given         Counseling was given to patient for the following topics: instructions for management.    Follow Up  No follow-ups on file.    Teofilo Reid MD  [facility]  [address] [address]  [phone]

## 2025-03-31 LAB — CALPROTECTIN STL-MCNT: 6 UG/G (ref 0–120)

## 2025-05-23 PROCEDURE — 87070 CULTURE OTHR SPECIMN AEROBIC: CPT | Performed by: NURSE PRACTITIONER

## 2025-05-23 PROCEDURE — 87186 SC STD MICRODIL/AGAR DIL: CPT | Performed by: NURSE PRACTITIONER

## 2025-05-23 PROCEDURE — 87205 SMEAR GRAM STAIN: CPT | Performed by: NURSE PRACTITIONER

## 2025-05-23 PROCEDURE — 87077 CULTURE AEROBIC IDENTIFY: CPT | Performed by: NURSE PRACTITIONER

## 2025-05-26 ENCOUNTER — PATIENT ROUNDING (BHMG ONLY) (OUTPATIENT)
Dept: URGENT CARE | Facility: CLINIC | Age: 25
End: 2025-05-26
Payer: COMMERCIAL

## 2025-07-11 ENCOUNTER — PATIENT ROUNDING (BHMG ONLY) (OUTPATIENT)
Dept: URGENT CARE | Facility: CLINIC | Age: 25
End: 2025-07-11
Payer: COMMERCIAL